# Patient Record
Sex: MALE | Race: WHITE
[De-identification: names, ages, dates, MRNs, and addresses within clinical notes are randomized per-mention and may not be internally consistent; named-entity substitution may affect disease eponyms.]

---

## 2021-04-26 ENCOUNTER — HOSPITAL ENCOUNTER (OUTPATIENT)
Dept: HOSPITAL 43 - DL.ENDO | Age: 51
Discharge: HOME | End: 2021-04-26
Attending: INTERNAL MEDICINE
Payer: COMMERCIAL

## 2021-04-26 DIAGNOSIS — D12.4: ICD-10-CM

## 2021-04-26 DIAGNOSIS — K21.9: ICD-10-CM

## 2021-04-26 DIAGNOSIS — Z98.890: ICD-10-CM

## 2021-04-26 DIAGNOSIS — F17.200: ICD-10-CM

## 2021-04-26 DIAGNOSIS — Z12.11: Primary | ICD-10-CM

## 2021-04-26 DIAGNOSIS — E03.9: ICD-10-CM

## 2021-04-26 PROCEDURE — 45385 COLONOSCOPY W/LESION REMOVAL: CPT

## 2021-04-26 NOTE — OR
DATE:  04/26/2021

 

PROCEDURE:  Total colonoscopy, NBI, and cold snare polypectomy.

 

INSTRUMENT USED:  CF-[id number] Olympus video colonoscope.

 

PREMEDICATIONS:  Fentanyl 200 mcg intravenous, Versed 5 mg intravenous.  Nasal

O2 cannula.

 

The procedure was done under pulse oximetry, BP recording, and cardiac monitor.

 

INDICATION:  Screening colonoscopic examination is done for detection of any

polypoid lesions and removal, endoscopic hemostasis therapy if needed.

 

DESCRIPTION OF PROCEDURE:  Initial rectal exam was unremarkable.  Rigid anoscopy

was normal.  The colonoscope was passed with relative ease up to the ileocecal

area.  Photographs were taken of the normal-appearing cecum, identified by

landmarks of appendiceal orifice and double-bulged ileocecal folds.  No bleeding

was noted from any of the visualized areas at the commencement of the

examination.  The bowel preparation was found to be adequate, Cameron scale 3 in

all the regions, total score 9.  No stricture.  No vascular ectasia.  No large

isolated ulcerations seen.  No evidence of diffuse inflammatory bowel disease in

the form of friability, contact bleeding, or ulcerations.  Probing the proximal

sides of folds and flexures using adequate distention and clearing up the stool

material, withdrawal of the scope was made.  In the proximal descending colon,

diminutive benign-appearing polyp was noted, NBI views were obtained,

photographs were taken, cold snare polypectomy was done, the tissue was

retrieved and sent for histopathology.  No bleeding was noted from any of the

visualized areas at the completion of the examination.

 

IMPRESSION:  Diminutive descending colon polyp.

 

The patient tolerated the procedure well.

 

DD:  04/26/2021 07:37:20

DT:  04/26/2021 10:11:29

[facility]

Job #:  [id number]

## 2021-07-14 ENCOUNTER — HOSPITAL ENCOUNTER (EMERGENCY)
Dept: HOSPITAL 43 - DL.ED | Age: 51
Discharge: HOME | End: 2021-07-14
Payer: COMMERCIAL

## 2021-07-14 DIAGNOSIS — Z79.899: ICD-10-CM

## 2021-07-14 DIAGNOSIS — M71.22: Primary | ICD-10-CM

## 2021-07-14 DIAGNOSIS — Z72.0: ICD-10-CM

## 2021-07-14 DIAGNOSIS — K21.9: ICD-10-CM

## 2021-07-14 DIAGNOSIS — E03.9: ICD-10-CM

## 2021-07-14 LAB
ANION GAP SERPL CALC-SCNC: 14.9 MEQ/L (ref 7–13)
CHLORIDE SERPL-SCNC: 104 MMOL/L (ref 98–107)
SODIUM SERPL-SCNC: 141 MMOL/L (ref 136–145)

## 2021-07-14 NOTE — EDM.PDOC
ED HPI GENERAL MEDICAL PROBLEM





- General


Chief Complaint: Skin Complaint


Stated Complaint: LEFT LEG SWOLLEN RED HOT


Time Seen by Provider: 07/14/21 13:50


Source of Information: Reports: Patient


History Limitations: Reports: No Limitations





- History of Present Illness


INITIAL COMMENTS - FREE TEXT/NARRATIVE: 





This 49 yo male patient reports to the ED with swelling and pain in his left 

knee. 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 The 

patient reports he noticed his left knee was "warm". 


Duration: Day(s): (2)


Location: Reports: Lower Extremity, Left


Quality: Reports: Ache, Dull


Severity: Moderate


Improves with: Reports: None


Worsens with: Reports: None


Context: Reports: Other


Associated Symptoms: Reports: No Other Symptoms





- Related Data


                                    Allergies











Allergy/AdvReac Type Severity Reaction Status Date / Time


 


No Known Allergies Allergy   Verified 07/14/21 13:40











Home Meds: 


                                    Home Meds





Acetaminophen 325 - 650 mg PO Q8H PRN 04/23/21 [History]


Acetaminophen/HYDROcodone [Norco 325-5 MG] 1 tab PO Q8H PRN 04/23/21 [History]


Levothyroxine 112 mcg PO ACBREAKFAST 04/23/21 [History]


Lidocaine 5% [Lidoderm 5%] 1 patch TOP DAILY PRN 04/23/21 [History]


Nabumetone [Relafen] 750 mg PO DAILY 04/23/21 [History]


Omeprazole 20 mg PO DAILY 04/23/21 [History]











Past Medical History


HEENT History: Reports: Hard of Hearing


Cardiovascular History: Reports: None


Respiratory History: Reports: None


Gastrointestinal History: Reports: GERD


Genitourinary History: Reports: None


Musculoskeletal History: Reports: Back Pain, Chronic, Neck Pain, Chronic, Other 

(See Below)


Other Musculoskeletal History: S/P LEFT ROTATOR CUFF REPAIR.  HX OF SEPTIC 

ARTHRITIS D/T UNKNOWN ORGANISM.  HX OF OSTEOMYELITIS


Neurological History: Reports: None


Psychiatric History: Reports: Anxiety


Endocrine/Metabolic History: Reports: Hypothyroidism


Hematologic History: Reports: None


Immunologic History: Reports: None


Oncologic (Cancer) History: Reports: None





- Infectious Disease History


Infectious Disease History: Reports: Chicken Pox





- Past Surgical History


Head Surgeries/Procedures: Reports: None


HEENT Surgical History: Reports: Oral Surgery


Cardiovascular Surgical History: Reports: None


Respiratory Surgical History: Reports: None


GI Surgical History: Reports: None


Male  Surgical History: Reports: Vasectomy


Endocrine Surgical History: Reports: None


Neurological Surgical History: Reports: None


Musculoskeletal Surgical History: Reports: Other (See Below)


Other Musculoskeletal Surgeries/Procedures:: S/P RIGHT FOOT SURGERY


Oncologic Surgical History: Reports: None


Dermatological Surgical History: Reports: Other (See Below)





Social & Family History





- Family History


Family Medical History: No Pertinent Family History





- Tobacco Use


Tobacco Use Status *Q: Current Every Day Tobacco User


Years of Tobacco use: 25


Packs/Tins Daily: 1





- Caffeine Use


Caffeine Use: Reports: Coffee, Energy Drinks, Soda


Other Caffeine Use: 2 CUPS COFFEE DAILY





- Recreational Drug Use


Recreational Drug Use: No





ED ROS GENERAL





- Review of Systems


Review Of Systems: Comprehensive ROS is negative, except as noted in HPI.





ED EXAM, SKIN/RASH


Exam: See Below


Exam Limited By: No Limitations


General Appearance: Alert, WD/WN, Mild Distress


Eye Exam: Bilateral Eye: EOMI, Normal Inspection, PERRL


Ears: Normal External Exam, Normal Canal, Hearing Grossly Normal, Normal TMs


Nose: Normal Inspection, Normal Mucosa, No Blood


Throat/Mouth: Normal Inspection, Normal Lips, Normal Teeth, Normal Gums, Normal 

Oropharynx, Normal Voice, No Airway Compromise


Head: Atraumatic, Normocephalic


Neck: Normal Inspection, Supple, Non-Tender, Full Range of Motion


Respiratory/Chest: No Respiratory Distress, Lungs Clear, Normal Breath Sounds, 

No Accessory Muscle Use, Chest Non-Tender


Cardiovascular: Normal Peripheral Pulses, Regular Rate, Rhythm, No Edema, No 

Gallop, No JVD, No Murmur, No Rub


GI/Abdominal: Normal Bowel Sounds, Soft, Non-Tender, No Organomegaly, No 

Distention, No Abnormal Bruit, No Mass


 (Male) Exam: Deferred


Rectal (Males) Exam: Deferred


Back Exam: Normal Inspection, Full Range of Motion, NT


Extremities: Leg Pain (left knee swelling, pain, warm to touch)


Neurological: Alert, Oriented, CN II-XII Intact, Normal Cognition, Normal Gait, 

Normal Reflexes, No Motor/Sensory Deficits


Psychiatric: Normal Affect, Normal Mood


Skin: Increased Warmth (left knee)


Location, Skin: Lower Extremity, Left


Associated features: Warmth, Tenderness, Swelling


Lymphatic: No Adenopathy





Course





- Vital Signs


Last Recorded V/S: 


                                Last Vital Signs











Temp  96.5 F L  07/14/21 13:27


 


Pulse  61   07/14/21 13:27


 


Resp  16   07/14/21 13:27


 


BP  136/78   07/14/21 13:27


 


Pulse Ox  100   07/14/21 13:27














- Orders/Labs/Meds


Orders: 


                               Active Orders 24 hr











 Category Date Time Status


 


 Peripheral IV Care [RC] .AS DIRECTED Care  07/14/21 13:29 Active


 


 CULTURE BLOOD [BC] Stat Lab  07/14/21 13:34 Received


 


 CULTURE BLOOD [BC] Stat Lab  07/14/21 13:38 Received


 


 Blood Culture x2 Reflex Set [OM.PC] Stat Oth  07/14/21 13:28 Ordered











Labs: 


                                Laboratory Tests











  07/14/21 07/14/21 07/14/21 Range/Units





  13:38 13:38 13:38 


 


WBC  9.9    (5.0-10.0)  10^3/uL


 


RBC  3.60 L    (4.6-6.2)  10^6/uL


 


Hgb  11.7 L    (14.0-18.0)  g/dL


 


Hct  34.9 L    (40.0-54.0)  %


 


MCV  96.9    ()  fL


 


MCH  32.5    (27.0-34.0)  pg


 


MCHC  33.5    (33.0-35.0)  g/dL


 


Plt Count  229    (150-450)  10^3/uL


 


Neut % (Auto)  75.5 H    (42.2-75.2)  %


 


Lymph % (Auto)  15.0 L    (20.5-50.1)  %


 


Mono % (Auto)  7.3    (2-8)  %


 


Eos % (Auto)  1.9    (1.0-3.0)  %


 


Baso % (Auto)  0.3    (0.0-1.0)  %


 


D-Dimer, Quantitative    1010 H  (0-400)  ng/mL


 


Sodium   141   (136-145)  mmol/L


 


Potassium   3.9   (3.5-5.1)  mmol/L


 


Chloride   104   ()  mmol/L


 


Carbon Dioxide   26   (21-32)  mmol/L


 


Anion Gap   14.9 H   (7-13)  mEq/L


 


BUN   13   (7-18)  mg/dL


 


Creatinine   0.94   (0.70-1.30)  mg/dL


 


Est Cr Clr Drug Dosing   103.19   mL/min


 


Estimated GFR (MDRD)   > 60   


 


BUN/Creatinine Ratio   13.8   (No establ ref range)  


 


Glucose   119 H   (70-99)  mg/dL


 


Uric Acid     (3.5-7.2)  mg/dL


 


Calcium   8.5   (8.5-10.1)  mg/dL


 


Total Bilirubin   0.6   (0.2-1.0)  mg/dL


 


AST   178 H   (15-37)  U/L


 


ALT   262 H   (16-63)  U/L


 


Alkaline Phosphatase   127 H   ()  U/L


 


Total Protein   6.8   (6.4-8.2)  g/dL


 


Albumin   3.1 L   (3.4-5.0)  g/dL


 


Globulin   3.7   


 


Albumin/Globulin Ratio   0.84   














  07/14/21 Range/Units





  13:38 


 


WBC   (5.0-10.0)  10^3/uL


 


RBC   (4.6-6.2)  10^6/uL


 


Hgb   (14.0-18.0)  g/dL


 


Hct   (40.0-54.0)  %


 


MCV   ()  fL


 


MCH   (27.0-34.0)  pg


 


MCHC   (33.0-35.0)  g/dL


 


Plt Count   (150-450)  10^3/uL


 


Neut % (Auto)   (42.2-75.2)  %


 


Lymph % (Auto)   (20.5-50.1)  %


 


Mono % (Auto)   (2-8)  %


 


Eos % (Auto)   (1.0-3.0)  %


 


Baso % (Auto)   (0.0-1.0)  %


 


D-Dimer, Quantitative   (0-400)  ng/mL


 


Sodium   (136-145)  mmol/L


 


Potassium   (3.5-5.1)  mmol/L


 


Chloride   ()  mmol/L


 


Carbon Dioxide   (21-32)  mmol/L


 


Anion Gap   (7-13)  mEq/L


 


BUN   (7-18)  mg/dL


 


Creatinine   (0.70-1.30)  mg/dL


 


Est Cr Clr Drug Dosing   mL/min


 


Estimated GFR (MDRD)   


 


BUN/Creatinine Ratio   (No establ ref range)  


 


Glucose   (70-99)  mg/dL


 


Uric Acid  4.5  (3.5-7.2)  mg/dL


 


Calcium   (8.5-10.1)  mg/dL


 


Total Bilirubin   (0.2-1.0)  mg/dL


 


AST   (15-37)  U/L


 


ALT   (16-63)  U/L


 


Alkaline Phosphatase   ()  U/L


 


Total Protein   (6.4-8.2)  g/dL


 


Albumin   (3.4-5.0)  g/dL


 


Globulin   


 


Albumin/Globulin Ratio   














Departure





- Departure


Time of Disposition: 15:44


Disposition: Home, Self-Care 01


Condition: Fair


Clinical Impression: 


Baker cyst


Qualifiers:


 Laterality: left Qualified Code(s): M71.22 - Synovial cyst of popliteal space 

[Baker], left knee








- Discharge Information


*PRESCRIPTION DRUG MONITORING PROGRAM REVIEWED*: Not Applicable


*COPY OF PRESCRIPTION DRUG MONITORING REPORT IN PATIENT RIAN: Not Applicable


Instructions:  [facility]


Forms:  ED Department Discharge


Care Plan Goals: 


The patient was advised of the examination, lab and ultrasound results during 

the visit. The patient was discharged with a script for Keflex (500 mg) #30 to 

take 1 by mouth 3 times per day for 10 days. The patient was encouraged to avoid

 pressure to the back of the left knee until the swelling, pain and redness 

reduce. The patient may take Tylenol and ibuprofen as directed for temporary 

symptom relief. If the patient has any additional symptoms or concerns, the 

patient should either return to the emergency department or visit his primary 

care facility. 





Sepsis Event Note (ED)





- Evaluation


Sepsis Screening Result: No Definite Risk





- Focused Exam


Vital Signs: 


                                   Vital Signs











  Temp Pulse Resp BP Pulse Ox


 


 07/14/21 13:27  96.5 F L  61  16  136/78  100














- My Orders


Last 24 Hours: 


My Active Orders





07/14/21 13:28


Blood Culture x2 Reflex Set [OM.PC] Stat 





07/14/21 13:29


Peripheral IV Care [RC] .AS DIRECTED 





07/14/21 13:34


CULTURE BLOOD [BC] Stat 





07/14/21 13:38


CULTURE BLOOD [BC] Stat 














- Assessment/Plan


Last 24 Hours: 


My Active Orders





07/14/21 13:28


Blood Culture x2 Reflex Set [OM.PC] Stat 





07/14/21 13:29


Peripheral IV Care [RC] .AS DIRECTED 





07/14/21 13:34


CULTURE BLOOD [BC] Stat 





07/14/21 13:38


CULTURE BLOOD [BC] Stat

## 2021-07-14 NOTE — US
EXAMINATION: Venous Doppler Lwr Ext Lt  SEX: Male   AGE: 50 years

 

CLINICAL HISTORY: 50-year-old male experiencing left knee pain and swelling.

Rule out DVT this  with knee pads. Serum D dimer 1010. 

 

INTERPRETATION: Abnormal.

 

1.  Large, avascular cystic collection of fluid behind the left knee has

appearance most consistent with Baker's cyst.

2.   No sign of intraluminal echogenic thrombus identified in the deep veins of

the left groin, thigh, knee or calf.

3.  Satisfactory augmentation and venous waveforms demonstrated respectively in

the peroneal/posterior tibial veins of the left calf, popliteal vein behind the

left knee, and proximally in the superficial/common femoral veins thigh and

groin.

 

 

 

CONCLUSION: Baker's cyst. No current evidence DVT left lower extremity.

## 2023-03-04 ENCOUNTER — HOSPITAL ENCOUNTER (EMERGENCY)
Dept: HOSPITAL 43 - DL.ED | Age: 53
Discharge: HOME | End: 2023-03-04
Payer: COMMERCIAL

## 2023-03-04 DIAGNOSIS — Z79.899: ICD-10-CM

## 2023-03-04 DIAGNOSIS — S46.011A: Primary | ICD-10-CM

## 2023-03-04 DIAGNOSIS — E03.9: ICD-10-CM

## 2023-03-04 DIAGNOSIS — K21.9: ICD-10-CM
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
## 2023-03-04 PROCEDURE — 99284 EMERGENCY DEPT VISIT MOD MDM: CPT

## 2023-03-04 PROCEDURE — 96372 THER/PROPH/DIAG INJ SC/IM: CPT

## 2023-03-04 PROCEDURE — 73030 X-RAY EXAM OF SHOULDER: CPT

## 2023-03-04 PROCEDURE — 99283 EMERGENCY DEPT VISIT LOW MDM: CPT
